# Patient Record
Sex: FEMALE | Race: WHITE | Employment: FULL TIME | URBAN - METROPOLITAN AREA
[De-identification: names, ages, dates, MRNs, and addresses within clinical notes are randomized per-mention and may not be internally consistent; named-entity substitution may affect disease eponyms.]

---

## 2019-09-20 ENCOUNTER — HOSPITAL ENCOUNTER (OUTPATIENT)
Dept: LAB | Age: 74
Discharge: HOME | End: 2019-09-20

## 2019-09-20 PROCEDURE — 88305 TISSUE EXAM BY PATHOLOGIST: CPT

## 2022-07-26 ENCOUNTER — HOSPITAL ENCOUNTER (OUTPATIENT)
Dept: CT IMAGING | Age: 77
Discharge: HOME | End: 2022-07-29
Payer: MEDICARE

## 2022-07-26 DIAGNOSIS — R11.2 NAUSEA AND VOMITING, INTRACTABILITY OF VOMITING NOT SPECIFIED, UNSPECIFIED VOMITING TYPE: ICD-10-CM

## 2022-07-26 DIAGNOSIS — R10.31 RLQ ABDOMINAL PAIN: ICD-10-CM

## 2022-07-26 LAB — CREAT BLD-MCNC: 1.11 MG/DL (ref 0.8–1.5)

## 2022-07-26 PROCEDURE — 74177 CT ABD & PELVIS W/CONTRAST: CPT

## 2022-07-26 PROCEDURE — 82565 ASSAY OF CREATININE: CPT

## 2022-07-26 PROCEDURE — 6360000004 HC RX CONTRAST MEDICATION: Performed by: NURSE PRACTITIONER

## 2022-07-26 RX ADMIN — DIATRIZOATE MEGLUMINE AND DIATRIZOATE SODIUM 15 ML: 660; 100 LIQUID ORAL; RECTAL at 16:19

## 2022-07-26 RX ADMIN — IOPAMIDOL 100 ML: 755 INJECTION, SOLUTION INTRAVENOUS at 16:19

## 2025-06-10 ENCOUNTER — OFFICE VISIT (OUTPATIENT)
Dept: OBGYN CLINIC | Age: 80
End: 2025-06-10
Payer: MEDICARE

## 2025-06-10 VITALS
DIASTOLIC BLOOD PRESSURE: 80 MMHG | HEIGHT: 64 IN | BODY MASS INDEX: 29.12 KG/M2 | SYSTOLIC BLOOD PRESSURE: 142 MMHG | WEIGHT: 170.6 LBS

## 2025-06-10 DIAGNOSIS — N89.8 VAGINAL ITCHING: ICD-10-CM

## 2025-06-10 DIAGNOSIS — N94.89 VAGINAL BURNING: ICD-10-CM

## 2025-06-10 DIAGNOSIS — N89.8 VAGINAL ODOR: Primary | ICD-10-CM

## 2025-06-10 PROCEDURE — 1123F ACP DISCUSS/DSCN MKR DOCD: CPT | Performed by: NURSE PRACTITIONER

## 2025-06-10 PROCEDURE — G8400 PT W/DXA NO RESULTS DOC: HCPCS | Performed by: NURSE PRACTITIONER

## 2025-06-10 PROCEDURE — 1036F TOBACCO NON-USER: CPT | Performed by: NURSE PRACTITIONER

## 2025-06-10 PROCEDURE — 1090F PRES/ABSN URINE INCON ASSESS: CPT | Performed by: NURSE PRACTITIONER

## 2025-06-10 PROCEDURE — 99203 OFFICE O/P NEW LOW 30 MIN: CPT | Performed by: NURSE PRACTITIONER

## 2025-06-10 PROCEDURE — 1159F MED LIST DOCD IN RCRD: CPT | Performed by: NURSE PRACTITIONER

## 2025-06-10 PROCEDURE — G8419 CALC BMI OUT NRM PARAM NOF/U: HCPCS | Performed by: NURSE PRACTITIONER

## 2025-06-10 PROCEDURE — G8427 DOCREV CUR MEDS BY ELIG CLIN: HCPCS | Performed by: NURSE PRACTITIONER

## 2025-06-10 PROCEDURE — 1160F RVW MEDS BY RX/DR IN RCRD: CPT | Performed by: NURSE PRACTITIONER

## 2025-06-10 RX ORDER — TERCONAZOLE 4 MG/G
CREAM VAGINAL
Qty: 45 G | Refills: 0 | Status: SHIPPED | OUTPATIENT
Start: 2025-06-10 | End: 2025-06-17

## 2025-06-10 RX ORDER — FAMOTIDINE 10 MG
10 TABLET ORAL 2 TIMES DAILY
COMMUNITY

## 2025-06-10 RX ORDER — DICYCLOMINE HYDROCHLORIDE 10 MG/1
10 CAPSULE ORAL
COMMUNITY

## 2025-06-10 RX ORDER — ASPIRIN 81 MG/1
81 TABLET ORAL DAILY
COMMUNITY

## 2025-06-10 RX ORDER — ALBUTEROL SULFATE 0.63 MG/3ML
1 SOLUTION RESPIRATORY (INHALATION) EVERY 6 HOURS PRN
COMMUNITY

## 2025-06-10 RX ORDER — CITALOPRAM HYDROBROMIDE 10 MG/1
10 TABLET ORAL DAILY
COMMUNITY

## 2025-06-10 NOTE — PROGRESS NOTES
The patient is a 80 y.o. No obstetric history on file. who is seen for {Symptoms; vaginal:82585}.  Onset was {numbers; 0-10:16707} {unit:11} ago. Symptoms have been {course:17::\"unchanged\"} since. Associated symptoms include:  {Symptoms; gyn associated:83119}.    HISTORY:    No obstetric history on file.  No LMP recorded.  Sexual History:  {Sexual Hx:5163}  Contraception:  {Contraception Methods:5051}  No current outpatient medications on file prior to visit.     No current facility-administered medications on file prior to visit.       ROS:  Feeling well. No dyspnea or chest pain on exertion.  No abdominal pain, change in bowel habits, black or bloody stools.  No urinary tract symptoms. GYN ROS: {gyn ros:502572}.    PHYSICAL EXAM:  There were no vitals taken for this visit.    The patient appears well, alert, oriented x 3, in no distress.  Lungs are clear. Heart RRR, no murmurs. Abdomen soft without tenderness, guarding, mass or organomegaly.  Pelvic: {pelvic exam:757147::\"normal external genitalia, vulva, vagina, cervix, uterus and adnexa\"}.    Female chaperone present for exam    ASSESSMENT:  No diagnosis found.    PLAN:  {Gyn plan:53571}  No orders of the defined types were placed in this encounter.        Supervising physician is  ****.  *** minutes spent in chart review, exam, counseling and documentation.

## 2025-06-10 NOTE — PROGRESS NOTES
The patient is a 80 y.o. No obstetric history on file. who is seen for possible yeast/BV infection. Reports vaginal itching, burning and discharge after antibiotic use. Mild vaginal odor. Denies burning/frequency with urination. Denies pelvic/abd/flank pain. Denies fevers. Denies hematuria. Cannot recall when exactly she took the antibiotics but states she has recurrent symptoms that come and then go away. Most recently used Monistat 1 and reports afterwards she had abnormal green discharge. Notes symptoms slightly improved, but didn't fully resolve.    HISTORY:    No obstetric history on file.  No LMP recorded. Patient is postmenopausal.  Sexual History:  Not sexually active   Contraception:  postmenopausal   Current Outpatient Medications on File Prior to Visit   Medication Sig Dispense Refill    aspirin 81 MG EC tablet Take 1 tablet by mouth daily      albuterol (ACCUNEB) 0.63 MG/3ML nebulizer solution Take 3 mLs by nebulization every 6 hours as needed for Wheezing      dicyclomine (BENTYL) 10 MG capsule Take 1 capsule by mouth 4 times daily (before meals and nightly)      citalopram (CELEXA) 10 MG tablet Take 1 tablet by mouth daily      BUPROPION HBR ER PO Take by mouth Pt unsure of dose      famotidine (PEPCID) 10 MG tablet Take 1 tablet by mouth 2 times daily      Omega-3 Fatty Acids (FISH OIL PO) Take by mouth      CRANBERRY PO Take by mouth       No current facility-administered medications on file prior to visit.       ROS:  Feeling well. No dyspnea or chest pain on exertion.  No abdominal pain, change in bowel habits, black or bloody stools.  No urinary tract symptoms. GYN ROS: see above.    PHYSICAL EXAM:  Blood pressure (!) 142/80, height 1.626 m (5' 4\"), weight 77.4 kg (170 lb 9.6 oz).    The patient appears well, alert, oriented x 3, in no distress.  Lungs are clear. Heart RRR, no murmurs. Abdomen soft without tenderness, guarding, mass or organomegaly.  Pelvic: VULVA: normal appearing vulva with no

## 2025-06-12 LAB
A VAGINAE DNA VAG QL NAA+PROBE: NORMAL SCORE
BVAB2 DNA VAG QL NAA+PROBE: NORMAL SCORE
C ALBICANS DNA VAG QL NAA+PROBE: NEGATIVE
C GLABRATA DNA VAG QL NAA+PROBE: NEGATIVE
MEGA1 DNA VAG QL NAA+PROBE: NORMAL SCORE
SPECIMEN SOURCE: NORMAL

## 2025-06-13 ENCOUNTER — RESULTS FOLLOW-UP (OUTPATIENT)
Dept: OBGYN CLINIC | Age: 80
End: 2025-06-13

## 2025-07-01 ENCOUNTER — TELEPHONE (OUTPATIENT)
Dept: OBGYN CLINIC | Age: 80
End: 2025-07-01

## 2025-07-01 NOTE — TELEPHONE ENCOUNTER
PT LVM reporting she has finished using the Terazol cream she was prescribed and is still having pain. Pt last seen 6/10/25 with reports of vaginal itching. Nuswab negative.

## 2025-07-02 RX ORDER — ESTRADIOL 0.1 MG/G
CREAM VAGINAL
Qty: 42.5 G | Refills: 3 | Status: SHIPPED | OUTPATIENT
Start: 2025-07-02

## 2025-07-02 NOTE — TELEPHONE ENCOUNTER
Jeannine Dubose, APRN - CNP  You16 hours ago (4:09 PM)     Can try estradiol cream- 2g nightly x2 weeks then 1g 3x weekly   Pt notified of the above and verbalized understanding